# Patient Record
Sex: MALE
[De-identification: names, ages, dates, MRNs, and addresses within clinical notes are randomized per-mention and may not be internally consistent; named-entity substitution may affect disease eponyms.]

---

## 2023-11-06 ENCOUNTER — NON-APPOINTMENT (OUTPATIENT)
Age: 33
End: 2023-11-06

## 2023-11-21 ENCOUNTER — APPOINTMENT (OUTPATIENT)
Dept: ORTHOPEDIC SURGERY | Facility: CLINIC | Age: 33
End: 2023-11-21

## 2024-02-26 PROBLEM — Z00.00 ENCOUNTER FOR PREVENTIVE HEALTH EXAMINATION: Status: ACTIVE | Noted: 2024-02-26

## 2024-03-04 ENCOUNTER — APPOINTMENT (OUTPATIENT)
Dept: RHEUMATOLOGY | Facility: CLINIC | Age: 34
End: 2024-03-04
Payer: COMMERCIAL

## 2024-03-04 VITALS
WEIGHT: 176 LBS | DIASTOLIC BLOOD PRESSURE: 64 MMHG | OXYGEN SATURATION: 100 % | TEMPERATURE: 97 F | BODY MASS INDEX: 25.2 KG/M2 | SYSTOLIC BLOOD PRESSURE: 103 MMHG | HEIGHT: 70 IN | HEART RATE: 83 BPM

## 2024-03-04 DIAGNOSIS — F17.200 NICOTINE DEPENDENCE, UNSPECIFIED, UNCOMPLICATED: ICD-10-CM

## 2024-03-04 PROCEDURE — 99213 OFFICE O/P EST LOW 20 MIN: CPT | Mod: 25

## 2024-03-04 PROCEDURE — 99204 OFFICE O/P NEW MOD 45 MIN: CPT | Mod: 25

## 2024-03-04 RX ORDER — PREDNISONE 5 MG/1
5 TABLET ORAL
Qty: 65 | Refills: 0 | Status: ACTIVE | COMMUNITY
Start: 2024-03-04 | End: 1900-01-01

## 2024-03-06 LAB
ALBUMIN MFR SERPL ELPH: 67.2 %
ALBUMIN SERPL ELPH-MCNC: 5.3 G/DL
ALBUMIN SERPL-MCNC: 5.3 G/DL
ALBUMIN/GLOB SERPL: 2 RATIO
ALP BLD-CCNC: 71 U/L
ALPHA1 GLOB MFR SERPL ELPH: 3.5 %
ALPHA1 GLOB SERPL ELPH-MCNC: 0.3 G/DL
ALPHA2 GLOB MFR SERPL ELPH: 7.9 %
ALPHA2 GLOB SERPL ELPH-MCNC: 0.6 G/DL
ALT SERPL-CCNC: 37 U/L
ANION GAP SERPL CALC-SCNC: 14 MMOL/L
AST SERPL-CCNC: 32 U/L
B-GLOBULIN MFR SERPL ELPH: 10.1 %
B-GLOBULIN SERPL ELPH-MCNC: 0.8 G/DL
BASOPHILS # BLD AUTO: 0.03 K/UL
BASOPHILS NFR BLD AUTO: 0.7 %
BILIRUB SERPL-MCNC: 0.6 MG/DL
BUN SERPL-MCNC: 20 MG/DL
CALCIUM SERPL-MCNC: 10.1 MG/DL
CCP AB SER IA-ACNC: <8 UNITS
CHLORIDE SERPL-SCNC: 103 MMOL/L
CO2 SERPL-SCNC: 25 MMOL/L
CREAT SERPL-MCNC: 1.06 MG/DL
CRP SERPL-MCNC: <3 MG/L
EGFR: 94 ML/MIN/1.73M2
EOSINOPHIL # BLD AUTO: 0.08 K/UL
EOSINOPHIL NFR BLD AUTO: 1.8 %
ERYTHROCYTE [SEDIMENTATION RATE] IN BLOOD BY WESTERGREN METHOD: 2 MM/HR
GAMMA GLOB FLD ELPH-MCNC: 0.9 G/DL
GAMMA GLOB MFR SERPL ELPH: 11.3 %
GLUCOSE SERPL-MCNC: 86 MG/DL
HBV CORE IGG+IGM SER QL: NONREACTIVE
HBV SURFACE AB SER QL: REACTIVE
HBV SURFACE AG SER QL: NONREACTIVE
HCT VFR BLD CALC: 51.3 %
HCV AB SER QL: NONREACTIVE
HCV S/CO RATIO: 0.08 S/CO
HGB BLD-MCNC: 17.1 G/DL
IMM GRANULOCYTES NFR BLD AUTO: 0.5 %
INTERPRETATION SERPL IEP-IMP: NORMAL
LYMPHOCYTES # BLD AUTO: 1.63 K/UL
LYMPHOCYTES NFR BLD AUTO: 37.5 %
MAN DIFF?: NORMAL
MCHC RBC-ENTMCNC: 29.8 PG
MCHC RBC-ENTMCNC: 33.3 GM/DL
MCV RBC AUTO: 89.4 FL
MONOCYTES # BLD AUTO: 0.35 K/UL
MONOCYTES NFR BLD AUTO: 8 %
NEUTROPHILS # BLD AUTO: 2.24 K/UL
NEUTROPHILS NFR BLD AUTO: 51.5 %
PLATELET # BLD AUTO: 155 K/UL
POTASSIUM SERPL-SCNC: 4.5 MMOL/L
PROT SERPL-MCNC: 7.9 G/DL
RBC # BLD: 5.74 M/UL
RBC # FLD: 12.8 %
RF+CCP IGG SER-IMP: NEGATIVE
RHEUMATOID FACT SER QL: <10 IU/ML
SODIUM SERPL-SCNC: 142 MMOL/L
WBC # FLD AUTO: 4.35 K/UL

## 2024-03-08 LAB
M TB IFN-G BLD-IMP: NEGATIVE
QUANTIFERON TB PLUS MITOGEN MINUS NIL: 4.91 IU/ML
QUANTIFERON TB PLUS NIL: 0.05 IU/ML
QUANTIFERON TB PLUS TB1 MINUS NIL: 0 IU/ML
QUANTIFERON TB PLUS TB2 MINUS NIL: 0 IU/ML

## 2024-04-01 ENCOUNTER — LABORATORY RESULT (OUTPATIENT)
Age: 34
End: 2024-04-01

## 2024-04-01 ENCOUNTER — APPOINTMENT (OUTPATIENT)
Dept: RHEUMATOLOGY | Facility: CLINIC | Age: 34
End: 2024-04-01
Payer: COMMERCIAL

## 2024-04-01 VITALS
HEIGHT: 70 IN | DIASTOLIC BLOOD PRESSURE: 74 MMHG | HEART RATE: 78 BPM | WEIGHT: 175 LBS | TEMPERATURE: 97.4 F | OXYGEN SATURATION: 99 % | SYSTOLIC BLOOD PRESSURE: 121 MMHG | BODY MASS INDEX: 25.05 KG/M2

## 2024-04-01 DIAGNOSIS — M19.071 PRIMARY OSTEOARTHRITIS, RIGHT ANKLE AND FOOT: ICD-10-CM

## 2024-04-01 DIAGNOSIS — L40.50 ARTHROPATHIC PSORIASIS, UNSPECIFIED: ICD-10-CM

## 2024-04-01 PROCEDURE — 99213 OFFICE O/P EST LOW 20 MIN: CPT | Mod: 25

## 2024-04-01 RX ORDER — METHOTREXATE 2.5 MG/1
2.5 TABLET ORAL
Qty: 26 | Refills: 2 | Status: ACTIVE | COMMUNITY
Start: 2024-04-01 | End: 1900-01-01

## 2024-04-01 RX ORDER — FOLIC ACID 1 MG/1
1 TABLET ORAL DAILY
Qty: 90 | Refills: 1 | Status: ACTIVE | COMMUNITY
Start: 2024-04-01 | End: 1900-01-01

## 2024-04-01 NOTE — ASSESSMENT
[FreeTextEntry1] : ASSESSMENT and PLAN:    34 yo M w longstanding h/o psoriasis and chronic L ankle arthritis.  #PsA Likely presentation of PsA however will consider other ddx RA, doubt crystals and unlikely infectious. No improving following CSI is unusual assuming the needle was directly into the joint. W/up obtained :CBC, CMP, CRP, Sed rate, RF, anti-CCP, SPEP. Infectious w/up. Plan: - Add Lyme test, [id number] - Start MTX 15 mg QW+ daily FA. - XR next visit  The risks and benefits of methotrexate were reviewed in detail  today. The most common risks were emphasized such as hepatotoxicity, allergic reactions, infections, bone marrow suppression, alopecia; Discussed impact on fertility. Patient's understanding of these issues was confirmed and will be reassessed going forward.   - Follow up in 4w   Skylar Rolle MD Rheumatology Attending

## 2024-04-01 NOTE — PHYSICAL EXAM
[TextEntry] :   PHYSICAL EXAM: Vital signs reviewed, normal . Pain 3/10 GEN: AAOx3, NAD - well appearing EYES: PERRL, EOMI. EAR, NOSE AND THROAT:  Oropharynx pink w/o ulcers or exudates. Optimal dentition. HEMATOLOGIC/LYMPHATIC: No LAD. PULMONARY: Good air movement, CTA, no wheezes or rales. CARDIO-VASCULAR: NS1S2, RRR, no mrg. GASTRO-INTESTINAL: Normoactive BS, soft NT/ND, no organomegaly. MUSCULOSKELETAL: DIPs- Full ROM, no synovitis. PIPs- Full ROM, no synovitis. MCPs- Full ROM, no synovitis. WRISTS- Full ROM, no synovitis. ELBOWS- Full ROM, no synovitis. SHOULDERS- Full ROM b/l. No localized tenderness in subacromial bursa or biceps tendon. HIPS- Full ROM including internal and external rotation; No localized tenderness over greater trochanters. KNEES- No effusion, normal ROM, no local tenderness in the region of anserine bursa or joint line. ANKLES- L: normal looking skin, normal warmth. scant ankle swelling pain w flex, ext, inv/eversion.  Full ROM, no synovitis. FEET- Mild pes planus, b/l, MTP squeeze tenderness. Warm extremities, 2+ radial and pedal pulses, no peripheral edema. NEUROLOGIC: Fluent speech, normal gait. Muscle strength 5/5 in all extremities. SKIN: Clear, no rashes.

## 2024-04-01 NOTE — REVIEW OF SYSTEMS
[TextEntry] :  REVIEW OF THE SYSTEMS CONSTITUTIONAL: No fever, chills, night sweats, weight loss, fatigue. SKIN:Diffuse  psoriatic plaques. No  photosensitivity, Raynaud's phenomenon, subcutaneous nodules, tophi, alopecia, skin thickening. EYES: No dry eyes, history of inflammatory eye disease. No blurry vision or diplopia. ENMT: No dry mouth, oral ulcers, sinus problems, epistaxis. No jaw or scalp tenderness. MUSCULOSKELETAL: As per HPI. NEUROLOGIC: No HA, paresthesia, generalized or focal weakness. CARDIO-VASCULAR: No CP, orthopnea, palpitations. PULMONARY: No SOB, cough, hemoptysis, wheezing. GASTROINTESTINAL: No recent/current diarrhea, nausea/vomiting, abdominal pain, bleed, dysphagia or GERD. GENITOURINARY: No hematuria or dysuria. HEMATOLOGIC/LYMPHATIC: No lymphadenopathy.

## 2024-04-01 NOTE — HISTORY OF PRESENT ILLNESS
[FreeTextEntry1] : 34 yo M w h/o psoriasis referred for chronic L ankle pain, receiving dx of PsA. Dx w psoriasis decades ago. Psoriasis is diffuse but not mostly of the trunk and extremities. Never received any tx beyond topical steroids which he is getting OTC, Trying anti-inflammatory diet as well.  L ankle  started bothering 6 mo ago. Started off slowly, smoldering over few months. With swelling buildup there was associated pain which now is 5/10 worse when walking.  He is limping when walking Better w rest. Took no additional meds for pain. In January went to podiatrist and received CSI L ankle, procedure was painful and there was no resolution. Morning stiffness 15 min @ L ankle. Other joints not bothersome ever. Denies ever having LBP. Denies having an episodes of eye inflammation or joint pain following infections.  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 Mother w Psoriasis Complete ROS otherwise neg and documented below.  Labs w: UA 5.6, Rf neg, anti-CCPneg,  ROCIO neg This visit I started steroid taper. 4/1/2024 He finished steroid taper, helped for his ankle pain. At rest he has no pain but pain is triggered by walking. Noticed less swelling. Other joints not involved. Morning stiffness 30 min @ L ankle. Diffuse psoriatic plaques.   details… detailed exam

## 2024-04-08 LAB — HLA-B27 QL NAA+PROBE: NORMAL

## 2024-05-06 ENCOUNTER — APPOINTMENT (OUTPATIENT)
Dept: RHEUMATOLOGY | Facility: CLINIC | Age: 34
End: 2024-05-06